# Patient Record
Sex: FEMALE | HISPANIC OR LATINO
[De-identification: names, ages, dates, MRNs, and addresses within clinical notes are randomized per-mention and may not be internally consistent; named-entity substitution may affect disease eponyms.]

---

## 2020-09-03 PROBLEM — Z00.00 ENCOUNTER FOR PREVENTIVE HEALTH EXAMINATION: Status: ACTIVE | Noted: 2020-09-03

## 2020-09-10 ENCOUNTER — APPOINTMENT (OUTPATIENT)
Dept: VASCULAR SURGERY | Facility: CLINIC | Age: 65
End: 2020-09-10
Payer: MEDICARE

## 2020-09-10 VITALS
BODY MASS INDEX: 24.35 KG/M2 | DIASTOLIC BLOOD PRESSURE: 78 MMHG | HEART RATE: 76 BPM | HEIGHT: 60 IN | TEMPERATURE: 98 F | WEIGHT: 124 LBS | SYSTOLIC BLOOD PRESSURE: 145 MMHG

## 2020-09-10 DIAGNOSIS — Z78.9 OTHER SPECIFIED HEALTH STATUS: ICD-10-CM

## 2020-09-10 DIAGNOSIS — Z83.3 FAMILY HISTORY OF DIABETES MELLITUS: ICD-10-CM

## 2020-09-10 DIAGNOSIS — Z86.79 PERSONAL HISTORY OF OTHER DISEASES OF THE CIRCULATORY SYSTEM: ICD-10-CM

## 2020-09-10 DIAGNOSIS — I87.2 VENOUS INSUFFICIENCY (CHRONIC) (PERIPHERAL): ICD-10-CM

## 2020-09-10 DIAGNOSIS — Z86.39 PERSONAL HISTORY OF OTHER ENDOCRINE, NUTRITIONAL AND METABOLIC DISEASE: ICD-10-CM

## 2020-09-10 DIAGNOSIS — I83.892 VARICOSE VEINS OF LEFT LOWER EXTREMITY WITH OTHER COMPLICATIONS: ICD-10-CM

## 2020-09-10 DIAGNOSIS — I83.90 ASYMPTOMATIC VARICOSE VEINS OF UNSPECIFIED LOWER EXTREMITY: ICD-10-CM

## 2020-09-10 PROCEDURE — 99203 OFFICE O/P NEW LOW 30 MIN: CPT

## 2020-09-10 PROCEDURE — 93970 EXTREMITY STUDY: CPT

## 2020-09-10 RX ORDER — LEVOTHYROXINE SODIUM 0.17 MG/1
TABLET ORAL
Refills: 0 | Status: ACTIVE | COMMUNITY

## 2020-09-10 RX ORDER — NITROFURANTOIN MACROCRYSTAL 100 MG
CAPSULE ORAL
Refills: 0 | Status: DISCONTINUED | COMMUNITY
End: 2020-09-10

## 2020-09-10 RX ORDER — ATORVASTATIN CALCIUM 80 MG/1
TABLET, FILM COATED ORAL
Refills: 0 | Status: ACTIVE | COMMUNITY

## 2020-09-10 RX ORDER — AMLODIPINE BESYLATE 5 MG/1
TABLET ORAL
Refills: 0 | Status: ACTIVE | COMMUNITY

## 2020-09-10 RX ORDER — GLIPIZIDE 2.5 MG/1
TABLET ORAL
Refills: 0 | Status: ACTIVE | COMMUNITY

## 2020-09-10 RX ORDER — ALOGLIPTIN 25 MG/1
TABLET, FILM COATED ORAL
Refills: 0 | Status: ACTIVE | COMMUNITY

## 2020-09-10 NOTE — REASON FOR VISIT
[Consultation] : a consultation visit [Family Member] : family member [FreeTextEntry1] : bulging varicose veins

## 2020-09-10 NOTE — ASSESSMENT
[Arterial/Venous Disease] : arterial/venous disease [Other: _____] : [unfilled] [FreeTextEntry1] : 64 y/o f w/ symptomatic venous insufficiency and varicose veins on the left leg\par \par Medical Conservative Management leg elevation prn \par Compression stockings 20-30mmhg \par follow up in three months with repeat duplex\par

## 2020-09-10 NOTE — PHYSICAL EXAM
[2+] : right 2+ [Ankle Swelling (On Exam)] : present [Ankle Swelling Bilaterally] : bilaterally  [Ankle Swelling On The Right] : mild [Varicose Veins Of Lower Extremities] : bilaterally [No Rash or Lesion] : No rash or lesion [Alert] : alert [Oriented to Person] : oriented to person [Oriented to Place] : oriented to place [Oriented to Time] : oriented to time [Calm] : calm [Normal Breath Sounds] : Normal breath sounds [Respiratory Effort] : normal respiratory effort [Normal Heart Sounds] : normal heart sounds [Normal Rate and Rhythm] : normal rate and rhythm [de-identified] : well [FreeTextEntry1] : Bilateral lower extremities with warm intact skin. Right leg w/ reticular veins and telangiectasias, left leg moderate-severe varicosities medial distal thigh to medial and posterior calf. CEAP C2 [de-identified] : soft, non tender, no palpable mass [de-identified] : YASMINL [de-identified] : Cooperative

## 2020-09-10 NOTE — CONSULT LETTER
[Dear  ___] : Dear  [unfilled], [Please see my note below.] : Please see my note below. [Consult Letter:] : I had the pleasure of evaluating your patient, [unfilled]. [Sincerely,] : Sincerely, [Consult Closing:] : Thank you very much for allowing me to participate in the care of this patient.  If you have any questions, please do not hesitate to contact me. [FreeTextEntry1] : She presented for evaluation of her left leg varicose veins. Venous duplex performed in my office was significant for left leg GSV reflux. I recommended continued conservative management at this time with continued use of compression stockings, exercise, and leg elevation. I will see her in my office in three months for follow up.  [FreeTextEntry3] : \par \par \par \par Giselle Sifuentes MD, RPVI\par Division of Vascular & Endovascular Surgery\par [facility], Department of Surgery\par

## 2020-09-10 NOTE — DATA REVIEWED
[FreeTextEntry1] : 9/10/2020 Venous Doppler: Bilateral DVT/SVT negative. Left GSV reflux > 2 secs, prox GSV 8.4mm, tribs w/ reflux up to 3.5mm. Right leg no reflux.

## 2020-09-10 NOTE — HISTORY OF PRESENT ILLNESS
[FreeTextEntry1] : 64 y/o f presents w/ complaints of bilateral lower extremity discomfort and worsening varicose veins, left leg worse than right. She complains of bilateral leg "tiredness and heaviness". She stands for prolonged periods of time doing housework. Elevation does not relieve her symptoms as her complaints remain the same upon awakening. She has been wearing compression stockings without any noticeable improvement. Denies history of DVT, or vein procedures/injections in the past. \par \par PCP is Dr. Yessi Haywood. \par \par PMHx: DM, High Cholesterol, HTN, thyroid issue (unable to give details)\par \par PSHx: Kidney stone removal\par \par Social Hx: Never a smoker\par \par Meds: Levothyroxine, glipizide, alogliptin, atorvastatin and amlodipine\par \par All: NKDA

## 2020-09-17 ENCOUNTER — RESULT REVIEW (OUTPATIENT)
Age: 65
End: 2020-09-17

## 2020-12-10 ENCOUNTER — APPOINTMENT (OUTPATIENT)
Dept: VASCULAR SURGERY | Facility: CLINIC | Age: 65
End: 2020-12-10

## 2022-04-25 ENCOUNTER — APPOINTMENT (OUTPATIENT)
Dept: CARDIOLOGY | Facility: CLINIC | Age: 67
End: 2022-04-25